# Patient Record
Sex: MALE | Race: WHITE | NOT HISPANIC OR LATINO | URBAN - METROPOLITAN AREA
[De-identification: names, ages, dates, MRNs, and addresses within clinical notes are randomized per-mention and may not be internally consistent; named-entity substitution may affect disease eponyms.]

---
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
## 2022-03-07 ENCOUNTER — OFFICE VISIT (OUTPATIENT)
Dept: INTERNAL MEDICINE | Facility: CLINIC | Age: 22
End: 2022-03-07

## 2022-03-07 VITALS
OXYGEN SATURATION: 99 % | RESPIRATION RATE: 18 BRPM | HEIGHT: 64 IN | TEMPERATURE: 97.6 F | WEIGHT: 99.8 LBS | BODY MASS INDEX: 17.04 KG/M2 | DIASTOLIC BLOOD PRESSURE: 62 MMHG | SYSTOLIC BLOOD PRESSURE: 114 MMHG | HEART RATE: 80 BPM

## 2022-03-07 DIAGNOSIS — Z11.59 NEED FOR HEPATITIS C SCREENING TEST: ICD-10-CM

## 2022-03-07 DIAGNOSIS — F90.9 ATTENTION DEFICIT HYPERACTIVITY DISORDER (ADHD), UNSPECIFIED ADHD TYPE: ICD-10-CM

## 2022-03-07 DIAGNOSIS — R63.6 UNDERWEIGHT: ICD-10-CM

## 2022-03-07 DIAGNOSIS — K59.00 CONSTIPATION, UNSPECIFIED CONSTIPATION TYPE: ICD-10-CM

## 2022-03-07 DIAGNOSIS — Z00.00 ANNUAL PHYSICAL EXAM: Primary | ICD-10-CM

## 2022-03-07 DIAGNOSIS — F84.0 AUTISM: ICD-10-CM

## 2022-03-07 DIAGNOSIS — Z13.220 SCREENING, LIPID: ICD-10-CM

## 2022-03-07 LAB
ALBUMIN SERPL-MCNC: 5.1 G/DL (ref 3.5–5.2)
ALBUMIN/GLOB SERPL: 2.3 G/DL
ALP SERPL-CCNC: 84 U/L (ref 39–117)
ALT SERPL W P-5'-P-CCNC: 17 U/L (ref 1–41)
ANION GAP SERPL CALCULATED.3IONS-SCNC: 10.3 MMOL/L (ref 5–15)
AST SERPL-CCNC: 17 U/L (ref 1–40)
BASOPHILS # BLD AUTO: 0.04 10*3/MM3 (ref 0–0.2)
BASOPHILS NFR BLD AUTO: 0.7 % (ref 0–1.5)
BILIRUB SERPL-MCNC: 0.4 MG/DL (ref 0–1.2)
BUN SERPL-MCNC: 14 MG/DL (ref 6–20)
BUN/CREAT SERPL: 17.3 (ref 7–25)
CALCIUM SPEC-SCNC: 9.6 MG/DL (ref 8.6–10.5)
CHLORIDE SERPL-SCNC: 102 MMOL/L (ref 98–107)
CHOLEST SERPL-MCNC: 124 MG/DL (ref 0–200)
CO2 SERPL-SCNC: 29.7 MMOL/L (ref 22–29)
CREAT SERPL-MCNC: 0.81 MG/DL (ref 0.76–1.27)
DEPRECATED RDW RBC AUTO: 43.8 FL (ref 37–54)
EGFRCR SERPLBLD CKD-EPI 2021: 129.4 ML/MIN/1.73
EOSINOPHIL # BLD AUTO: 0.1 10*3/MM3 (ref 0–0.4)
EOSINOPHIL NFR BLD AUTO: 1.6 % (ref 0.3–6.2)
ERYTHROCYTE [DISTWIDTH] IN BLOOD BY AUTOMATED COUNT: 12.9 % (ref 12.3–15.4)
GLOBULIN UR ELPH-MCNC: 2.2 GM/DL
GLUCOSE SERPL-MCNC: 80 MG/DL (ref 65–99)
HCT VFR BLD AUTO: 48.7 % (ref 37.5–51)
HDLC SERPL-MCNC: 47 MG/DL (ref 40–60)
HGB BLD-MCNC: 16.1 G/DL (ref 13–17.7)
IMM GRANULOCYTES # BLD AUTO: 0.01 10*3/MM3 (ref 0–0.05)
IMM GRANULOCYTES NFR BLD AUTO: 0.2 % (ref 0–0.5)
LDLC SERPL CALC-MCNC: 65 MG/DL (ref 0–100)
LDLC/HDLC SERPL: 1.42 {RATIO}
LYMPHOCYTES # BLD AUTO: 2.75 10*3/MM3 (ref 0.7–3.1)
LYMPHOCYTES NFR BLD AUTO: 45.2 % (ref 19.6–45.3)
MCH RBC QN AUTO: 30.4 PG (ref 26.6–33)
MCHC RBC AUTO-ENTMCNC: 33.1 G/DL (ref 31.5–35.7)
MCV RBC AUTO: 92.1 FL (ref 79–97)
MONOCYTES # BLD AUTO: 0.72 10*3/MM3 (ref 0.1–0.9)
MONOCYTES NFR BLD AUTO: 11.8 % (ref 5–12)
NEUTROPHILS NFR BLD AUTO: 2.47 10*3/MM3 (ref 1.7–7)
NEUTROPHILS NFR BLD AUTO: 40.5 % (ref 42.7–76)
NRBC BLD AUTO-RTO: 0 /100 WBC (ref 0–0.2)
PLATELET # BLD AUTO: 282 10*3/MM3 (ref 140–450)
PMV BLD AUTO: 10.4 FL (ref 6–12)
POTASSIUM SERPL-SCNC: 4.2 MMOL/L (ref 3.5–5.2)
PROT SERPL-MCNC: 7.3 G/DL (ref 6–8.5)
RBC # BLD AUTO: 5.29 10*6/MM3 (ref 4.14–5.8)
SODIUM SERPL-SCNC: 142 MMOL/L (ref 136–145)
TRIGL SERPL-MCNC: 51 MG/DL (ref 0–150)
TSH SERPL DL<=0.05 MIU/L-ACNC: 2.21 UIU/ML (ref 0.27–4.2)
VLDLC SERPL-MCNC: 12 MG/DL (ref 5–40)
WBC NRBC COR # BLD: 6.09 10*3/MM3 (ref 3.4–10.8)

## 2022-03-07 PROCEDURE — 80053 COMPREHEN METABOLIC PANEL: CPT | Performed by: INTERNAL MEDICINE

## 2022-03-07 PROCEDURE — 90471 IMMUNIZATION ADMIN: CPT | Performed by: INTERNAL MEDICINE

## 2022-03-07 PROCEDURE — 80061 LIPID PANEL: CPT | Performed by: INTERNAL MEDICINE

## 2022-03-07 PROCEDURE — 99395 PREV VISIT EST AGE 18-39: CPT | Performed by: INTERNAL MEDICINE

## 2022-03-07 PROCEDURE — 90649 4VHPV VACCINE 3 DOSE IM: CPT | Performed by: INTERNAL MEDICINE

## 2022-03-07 PROCEDURE — 85025 COMPLETE CBC W/AUTO DIFF WBC: CPT | Performed by: INTERNAL MEDICINE

## 2022-03-07 PROCEDURE — 84443 ASSAY THYROID STIM HORMONE: CPT | Performed by: INTERNAL MEDICINE

## 2022-03-07 RX ORDER — AMOXICILLIN 250 MG
1 CAPSULE ORAL DAILY
Qty: 90 TABLET | Refills: 1 | Status: SHIPPED | OUTPATIENT
Start: 2022-03-07 | End: 2022-10-31

## 2022-03-07 NOTE — PROGRESS NOTES
"Chief Complaint  Annual Exam (/)    Subjective          Lazaro Atkinson presents to [facility]  History of Present Illness     Has been dealing with constipation regularly  Tried miralax  Eats very well, not super picky  Doesn't complain of constipation, but mom notices large firm stools    Autism well controlled  ADHD  Doing well off meds  Currently in [facility]    No chest pain  No trouble breathing    Eats well, but still doesn't gain a lot of weight    Out of school  Helping out at home  Not working currently    Objective   Vital Signs:   /62 (BP Location: Left arm, Patient Position: Sitting, Cuff Size: Adult)   Pulse 80   Temp 97.6 °F (36.4 °C)   Resp 18   Ht 162.6 cm (64\")   Wt 45.3 kg (99 lb 12.8 oz)   SpO2 99%   BMI 17.13 kg/m²     Physical Exam  Vitals reviewed.   Constitutional:       Appearance: Normal appearance. He is well-developed.   HENT:      Head: Normocephalic and atraumatic.      Right Ear: External ear normal.      Left Ear: External ear normal.   Eyes:      Conjunctiva/sclera: Conjunctivae normal.      Pupils: Pupils are equal, round, and reactive to light.   Cardiovascular:      Rate and Rhythm: Normal rate and regular rhythm.      Heart sounds: No murmur heard.    No friction rub. No gallop.   Pulmonary:      Effort: Pulmonary effort is normal.      Breath sounds: Normal breath sounds. No wheezing or rhonchi.   Skin:     General: Skin is warm and dry.   Neurological:      Mental Status: He is alert and oriented to person, place, and time.      Cranial Nerves: No cranial nerve deficit.   Psychiatric:         Mood and Affect: Affect normal.         Behavior: Behavior normal.         Thought Content: Thought content normal.        Result Review :           No results found for this or any previous visit.         Procedures        Assessment and Plan    Diagnoses and all orders for this visit:    1. Annual physical exam " (Primary)    2. Constipation, unspecified constipation type  Comments:  will try senna and add miralax as needed; can do imaging if no improvement    3. Underweight  Comments:  will try protein shake  Orders:  -     Comprehensive Metabolic Panel  -     CBC & Differential  -     TSH    4. Autism  Comments:  doing well on Laurie P Waiver    5. Attention deficit hyperactivity disorder (ADHD), unspecified ADHD type  Comments:  doing well off meds    6. Need for hepatitis C screening test  -     Hepatitis C Antibody; Future    7. Screening, lipid  -     Lipid Panel    Other orders  -     sennosides-docusate (senna-docusate sodium) 8.6-50 MG per tablet; Take 1 tablet by mouth Daily.  Dispense: 90 tablet; Refill: 1  -     HPV Vaccine QuadriValent 3 Dose IM        19 to 39: Counseling/Anticipatory Guidance Discussed: nutrition, dental health and immunizations        Follow Up   Return in about 1 year (around 3/7/2023).  Patient was given instructions and counseling regarding his condition or for health maintenance advice. Please see specific information pulled into the AVS if appropriate.

## 2022-05-09 ENCOUNTER — CLINICAL SUPPORT (OUTPATIENT)
Dept: INTERNAL MEDICINE | Facility: CLINIC | Age: 22
End: 2022-05-09

## 2022-05-09 DIAGNOSIS — Z23 NEED FOR HPV VACCINATION: Primary | ICD-10-CM

## 2022-05-09 PROCEDURE — 90651 9VHPV VACCINE 2/3 DOSE IM: CPT | Performed by: PHYSICIAN ASSISTANT

## 2022-05-09 PROCEDURE — 90471 IMMUNIZATION ADMIN: CPT | Performed by: PHYSICIAN ASSISTANT

## 2022-09-08 ENCOUNTER — CLINICAL SUPPORT (OUTPATIENT)
Dept: INTERNAL MEDICINE | Facility: CLINIC | Age: 22
End: 2022-09-08

## 2022-09-08 DIAGNOSIS — Z23 NEED FOR HPV VACCINATION: Primary | ICD-10-CM

## 2022-09-08 PROCEDURE — 90649 4VHPV VACCINE 3 DOSE IM: CPT | Performed by: NURSE PRACTITIONER

## 2022-09-08 PROCEDURE — 90471 IMMUNIZATION ADMIN: CPT | Performed by: NURSE PRACTITIONER

## 2022-09-22 ENCOUNTER — TELEPHONE (OUTPATIENT)
Dept: INTERNAL MEDICINE | Facility: CLINIC | Age: 22
End: 2022-09-22

## 2022-09-27 ENCOUNTER — TELEPHONE (OUTPATIENT)
Dept: INTERNAL MEDICINE | Facility: CLINIC | Age: 22
End: 2022-09-27

## 2022-12-06 ENCOUNTER — CLINICAL SUPPORT (OUTPATIENT)
Dept: INTERNAL MEDICINE | Facility: CLINIC | Age: 22
End: 2022-12-06

## 2022-12-06 DIAGNOSIS — Z23 NEED FOR INFLUENZA VACCINATION: Primary | ICD-10-CM

## 2022-12-06 PROCEDURE — 90686 IIV4 VACC NO PRSV 0.5 ML IM: CPT | Performed by: INTERNAL MEDICINE

## 2022-12-06 PROCEDURE — 90471 IMMUNIZATION ADMIN: CPT | Performed by: INTERNAL MEDICINE

## 2023-01-17 ENCOUNTER — IMMUNIZATION (OUTPATIENT)
Dept: INTERNAL MEDICINE | Facility: CLINIC | Age: 23
End: 2023-01-17
Payer: MEDICAID

## 2023-01-17 PROCEDURE — 91312 COVID-19 (PFIZER) BIVALENT BOOSTER 12+YRS: CPT | Performed by: INTERNAL MEDICINE

## 2023-01-17 PROCEDURE — 0124A COVID-19 (PFIZER) BIVALENT BOOSTER 12+YRS: CPT | Performed by: INTERNAL MEDICINE

## 2023-10-11 ENCOUNTER — CLINICAL SUPPORT (OUTPATIENT)
Dept: INTERNAL MEDICINE | Facility: CLINIC | Age: 23
End: 2023-10-11
Payer: MEDICAID

## 2023-10-11 DIAGNOSIS — Z23 ENCOUNTER FOR IMMUNIZATION: Primary | ICD-10-CM

## 2023-10-11 PROCEDURE — 90686 IIV4 VACC NO PRSV 0.5 ML IM: CPT | Performed by: INTERNAL MEDICINE

## 2023-10-11 PROCEDURE — 90471 IMMUNIZATION ADMIN: CPT | Performed by: INTERNAL MEDICINE

## 2023-10-17 ENCOUNTER — TELEPHONE (OUTPATIENT)
Dept: INTERNAL MEDICINE | Facility: CLINIC | Age: 23
End: 2023-10-17
Payer: MEDICAID

## 2023-10-17 NOTE — TELEPHONE ENCOUNTER
Caller: JULIEN JUSTIN    Relationship: Mother     Best call back number: [phone]     What is the best time to reach you: ASAP    Who are you requesting to speak with (clinical staff, provider,  specific staff member): CLINICAL STAFF    What was the call regarding: PATIENT MAP 10 PAPERWORK WAS FAXED OVER INCORRECTLY AGAIN. THERE WAS NO DIAGNOSIS CODE ON THE PAPERWORK AND THE PATIENTS MOTHER WOULD LIKE A CALL TO REVIEW THE PAPERWORK

## 2023-11-17 ENCOUNTER — OFFICE VISIT (OUTPATIENT)
Dept: INTERNAL MEDICINE | Facility: CLINIC | Age: 23
End: 2023-11-17
Payer: MEDICAID

## 2023-11-17 VITALS
TEMPERATURE: 98.9 F | RESPIRATION RATE: 18 BRPM | HEART RATE: 78 BPM | SYSTOLIC BLOOD PRESSURE: 112 MMHG | HEIGHT: 64 IN | BODY MASS INDEX: 16.7 KG/M2 | OXYGEN SATURATION: 98 % | WEIGHT: 97.8 LBS | DIASTOLIC BLOOD PRESSURE: 68 MMHG

## 2023-11-17 DIAGNOSIS — Z13.220 SCREENING, LIPID: ICD-10-CM

## 2023-11-17 DIAGNOSIS — Z00.00 ANNUAL PHYSICAL EXAM: Primary | ICD-10-CM

## 2023-11-17 DIAGNOSIS — Z11.59 NEED FOR HEPATITIS C SCREENING TEST: ICD-10-CM

## 2023-11-17 DIAGNOSIS — L40.9 PSORIASIS: ICD-10-CM

## 2023-11-17 LAB
25(OH)D3 SERPL-MCNC: 14.4 NG/ML (ref 30–100)
BASOPHILS # BLD AUTO: 0.06 10*3/MM3 (ref 0–0.2)
BASOPHILS NFR BLD AUTO: 1 % (ref 0–1.5)
CHOLEST SERPL-MCNC: 111 MG/DL (ref 0–200)
DEPRECATED RDW RBC AUTO: 40.7 FL (ref 37–54)
EOSINOPHIL # BLD AUTO: 0.1 10*3/MM3 (ref 0–0.4)
EOSINOPHIL NFR BLD AUTO: 1.7 % (ref 0.3–6.2)
ERYTHROCYTE [DISTWIDTH] IN BLOOD BY AUTOMATED COUNT: 12.3 % (ref 12.3–15.4)
FOLATE SERPL-MCNC: 8.24 NG/ML (ref 4.78–24.2)
HCT VFR BLD AUTO: 44.9 % (ref 37.5–51)
HCV AB SER DONR QL: NORMAL
HDLC SERPL-MCNC: 48 MG/DL (ref 40–60)
HGB BLD-MCNC: 14.8 G/DL (ref 13–17.7)
IMM GRANULOCYTES # BLD AUTO: 0.01 10*3/MM3 (ref 0–0.05)
IMM GRANULOCYTES NFR BLD AUTO: 0.2 % (ref 0–0.5)
IRON 24H UR-MRATE: 116 MCG/DL (ref 59–158)
IRON SATN MFR SERPL: 34 % (ref 20–50)
LDLC SERPL CALC-MCNC: 46 MG/DL (ref 0–100)
LDLC/HDLC SERPL: 0.95 {RATIO}
LYMPHOCYTES # BLD AUTO: 2.61 10*3/MM3 (ref 0.7–3.1)
LYMPHOCYTES NFR BLD AUTO: 45.2 % (ref 19.6–45.3)
MAGNESIUM SERPL-MCNC: 2.2 MG/DL (ref 1.6–2.6)
MCH RBC QN AUTO: 29.7 PG (ref 26.6–33)
MCHC RBC AUTO-ENTMCNC: 33 G/DL (ref 31.5–35.7)
MCV RBC AUTO: 90 FL (ref 79–97)
MONOCYTES # BLD AUTO: 0.59 10*3/MM3 (ref 0.1–0.9)
MONOCYTES NFR BLD AUTO: 10.2 % (ref 5–12)
NEUTROPHILS NFR BLD AUTO: 2.4 10*3/MM3 (ref 1.7–7)
NEUTROPHILS NFR BLD AUTO: 41.7 % (ref 42.7–76)
NRBC BLD AUTO-RTO: 0 /100 WBC (ref 0–0.2)
PLATELET # BLD AUTO: 268 10*3/MM3 (ref 140–450)
PMV BLD AUTO: 11.4 FL (ref 6–12)
RBC # BLD AUTO: 4.99 10*6/MM3 (ref 4.14–5.8)
TIBC SERPL-MCNC: 341 MCG/DL (ref 298–536)
TRANSFERRIN SERPL-MCNC: 229 MG/DL (ref 200–360)
TRIGL SERPL-MCNC: 87 MG/DL (ref 0–150)
TSH SERPL DL<=0.05 MIU/L-ACNC: 0.78 UIU/ML (ref 0.27–4.2)
VIT B12 BLD-MCNC: 882 PG/ML (ref 211–946)
VLDLC SERPL-MCNC: 17 MG/DL (ref 5–40)
WBC NRBC COR # BLD AUTO: 5.77 10*3/MM3 (ref 3.4–10.8)

## 2023-11-17 PROCEDURE — 83735 ASSAY OF MAGNESIUM: CPT | Performed by: INTERNAL MEDICINE

## 2023-11-17 PROCEDURE — 80053 COMPREHEN METABOLIC PANEL: CPT | Performed by: INTERNAL MEDICINE

## 2023-11-17 PROCEDURE — 82746 ASSAY OF FOLIC ACID SERUM: CPT | Performed by: INTERNAL MEDICINE

## 2023-11-17 PROCEDURE — 82607 VITAMIN B-12: CPT | Performed by: INTERNAL MEDICINE

## 2023-11-17 PROCEDURE — 84466 ASSAY OF TRANSFERRIN: CPT | Performed by: INTERNAL MEDICINE

## 2023-11-17 PROCEDURE — 86803 HEPATITIS C AB TEST: CPT | Performed by: INTERNAL MEDICINE

## 2023-11-17 PROCEDURE — 83540 ASSAY OF IRON: CPT | Performed by: INTERNAL MEDICINE

## 2023-11-17 PROCEDURE — 85025 COMPLETE CBC W/AUTO DIFF WBC: CPT | Performed by: INTERNAL MEDICINE

## 2023-11-17 PROCEDURE — 82306 VITAMIN D 25 HYDROXY: CPT | Performed by: INTERNAL MEDICINE

## 2023-11-17 PROCEDURE — 80061 LIPID PANEL: CPT | Performed by: INTERNAL MEDICINE

## 2023-11-17 PROCEDURE — 84443 ASSAY THYROID STIM HORMONE: CPT | Performed by: INTERNAL MEDICINE

## 2023-11-17 RX ORDER — BETAMETHASONE DIPROPIONATE 0.5 MG/G
1 CREAM TOPICAL 2 TIMES DAILY
Qty: 45 G | Refills: 1 | Status: SHIPPED | OUTPATIENT
Start: 2023-11-17

## 2023-11-17 RX ORDER — CLOBETASOL PROPIONATE 0.46 MG/ML
SOLUTION TOPICAL 2 TIMES DAILY
Qty: 50 ML | Refills: 1 | Status: SHIPPED | OUTPATIENT
Start: 2023-11-17

## 2023-11-17 NOTE — PROGRESS NOTES
"Chief Complaint  Annual Exam, Hair/Scalp Problem (Itchy scalp ), and Nail Problem (Nail is really thick on the left feet big toe)    Subjective      History of Present Illness  Lazaro Atkinson is a 23 y.o. male who presents to [facility] with a past medical history of  Past Medical History:   Diagnosis Date    ADHD     Autism     Developmental delay     Premature baby       has noticed a few spots on his scalp  They have tried some otc shampoos and leave in conditioners  He does have a spot that is red and puffy    Left great toenail with a very thickened toenaisl    No chest pain  No truble breahting    No family histoyr of colon or prostate cancer    Tired at times, but otherwise doing well      Objective   Vital Signs:   Vitals:    11/17/23 1331   BP: 112/68   BP Location: Right arm   Patient Position: Sitting   Cuff Size: Small Adult   Pulse: 78   Resp: 18   Temp: 98.9 °F (37.2 °C)   TempSrc: Temporal   SpO2: 98%   Weight: 44.4 kg (97 lb 12.8 oz)   Height: 162.6 cm (64.02\")     Body mass index is 16.78 kg/m².    Wt Readings from Last 3 Encounters:   11/17/23 44.4 kg (97 lb 12.8 oz)   10/31/22 45.1 kg (99 lb 8 oz)   03/07/22 45.3 kg (99 lb 12.8 oz)     BP Readings from Last 3 Encounters:   11/17/23 112/68   10/31/22 120/71   03/07/22 114/62       Health Maintenance   Topic Date Due    ANNUAL PHYSICAL  03/07/2023    BMI FOLLOWUP  11/17/2024    TDAP/TD VACCINES (3 - Td or Tdap) 11/17/2033    HEPATITIS C SCREENING  Completed    COVID-19 Vaccine  Completed    Pneumococcal Vaccine 0-64  Completed    INFLUENZA VACCINE  Completed    MENINGOCOCCAL VACCINE  Completed    HPV VACCINES  Completed       Physical Exam  Vitals reviewed.   Constitutional:       Appearance: Normal appearance. He is well-developed.   HENT:      Head: Normocephalic and atraumatic.      Right Ear: External ear normal.      Left Ear: External ear normal.   Eyes:      Conjunctiva/sclera: " Conjunctivae normal.      Pupils: Pupils are equal, round, and reactive to light.   Cardiovascular:      Rate and Rhythm: Normal rate and regular rhythm.      Heart sounds: No murmur heard.     No friction rub. No gallop.   Pulmonary:      Effort: Pulmonary effort is normal.      Breath sounds: Normal breath sounds. No wheezing or rhonchi.   Skin:     General: Skin is warm and dry.      Comments: Significant flaking of his scalp with auspitz sign   Neurological:      Mental Status: He is alert and oriented to person, place, and time.   Psychiatric:         Mood and Affect: Affect normal.         Behavior: Behavior normal.         Thought Content: Thought content normal.            Result Review :  The following data was reviewed by: Feli Marques MD on 11/17/2023:      Procedures        Assessment and Plan   Diagnoses and all orders for this visit:    1. Annual physical exam (Primary)  Comments:  counseled on routine screening and healthy lifestyle  Orders:  -     Comprehensive Metabolic Panel  -     CBC & Differential  -     TSH  -     Lipid Panel  -     Vitamin D,25-Hydroxy  -     Vitamin B12 & Folate  -     Iron Profile  -     Magnesium    2. Psoriasis  Comments:  will try creams to start, but fairly significant so may need po treatment  Orders:  -     Comprehensive Metabolic Panel  -     CBC & Differential  -     TSH  -     Lipid Panel  -     Vitamin D,25-Hydroxy  -     Vitamin B12 & Folate  -     Iron Profile  -     Magnesium    3. Screening, lipid  -     Lipid Panel    4. Need for hepatitis C screening test  -     Hepatitis C Antibody; Future  -     Hepatitis C Antibody    Other orders  -     betamethasone dipropionate 0.05 % cream; Apply 1 application  topically to the appropriate area as directed 2 (Two) Times a Day.  Dispense: 45 g; Refill: 1  -     clobetasol (TEMOVATE) 0.05 % external solution; Apply  topically to the appropriate area as directed 2 (Two) Times a Day.  Dispense: 50 mL; Refill: 1  -      Tdap Vaccine => 8yo IM (BOOSTRIX)      Will check labs and adjust meds as needed based on results    BMI is below normal parameters (malnutrition). Recommendations: thin, but that is a family history and he is eatin well         FOLLOW UP  Return in about 3 months (around 2/17/2024).  Patient was given instructions and counseling regarding his condition or for health maintenance advice. Please see specific information pulled into the AVS if appropriate.       Feli Marques MD  11/26/23  18:04 EST    CURRENT & DISCONTINUED MEDICATIONS  Current Outpatient Medications   Medication Instructions    acetaminophen (TYLENOL) 500 mg, Oral, Every 6 Hours PRN    betamethasone dipropionate 0.05 % cream 1 application , Topical, 2 Times Daily    clobetasol (TEMOVATE) 0.05 % external solution Topical, 2 Times Daily       Medications Discontinued During This Encounter   Medication Reason    brompheniramine-pseudoephedrine-DM 30-2-10 MG/5ML syrup     oseltamivir (Tamiflu) 75 MG capsule     predniSONE (DELTASONE) 20 MG tablet

## 2023-11-18 LAB
ALBUMIN SERPL-MCNC: 5 G/DL (ref 3.5–5.2)
ALBUMIN/GLOB SERPL: 2.6 G/DL
ALP SERPL-CCNC: 78 U/L (ref 39–117)
ALT SERPL W P-5'-P-CCNC: 16 U/L (ref 1–41)
ANION GAP SERPL CALCULATED.3IONS-SCNC: 10.7 MMOL/L (ref 5–15)
AST SERPL-CCNC: 14 U/L (ref 1–40)
BILIRUB SERPL-MCNC: 0.2 MG/DL (ref 0–1.2)
BUN SERPL-MCNC: 19 MG/DL (ref 6–20)
BUN/CREAT SERPL: 23.5 (ref 7–25)
CALCIUM SPEC-SCNC: 9.6 MG/DL (ref 8.6–10.5)
CHLORIDE SERPL-SCNC: 101 MMOL/L (ref 98–107)
CO2 SERPL-SCNC: 28.3 MMOL/L (ref 22–29)
CREAT SERPL-MCNC: 0.81 MG/DL (ref 0.76–1.27)
EGFRCR SERPLBLD CKD-EPI 2021: 128.6 ML/MIN/1.73
GLOBULIN UR ELPH-MCNC: 1.9 GM/DL
GLUCOSE SERPL-MCNC: 73 MG/DL (ref 65–99)
POTASSIUM SERPL-SCNC: 4.2 MMOL/L (ref 3.5–5.2)
PROT SERPL-MCNC: 6.9 G/DL (ref 6–8.5)
SODIUM SERPL-SCNC: 140 MMOL/L (ref 136–145)

## 2023-11-25 ENCOUNTER — PATIENT MESSAGE (OUTPATIENT)
Dept: INTERNAL MEDICINE | Facility: CLINIC | Age: 23
End: 2023-11-25
Payer: MEDICAID

## 2023-11-27 NOTE — TELEPHONE ENCOUNTER
From: Lazaro Atkinson  To: Feli Marques  Sent: 11/25/2023 9:37 AM EST  Subject: Psoriasis    The cream and the liquid for Lazaro’s Psoriasis is not working. Can you call in the pill form for him?

## 2023-11-29 ENCOUNTER — PRIOR AUTHORIZATION (OUTPATIENT)
Dept: INTERNAL MEDICINE | Facility: CLINIC | Age: 23
End: 2023-11-29
Payer: MEDICAID

## 2023-11-29 NOTE — TELEPHONE ENCOUNTER
Pa started for otezla via cover my meds          PA has been Approved. PA End Date: 05/27/2024

## 2023-12-01 NOTE — TELEPHONE ENCOUNTER
Pt's mother calling in wanting to know if paper had been faxed back to pharmacy with the information they were requesting.

## 2023-12-01 NOTE — TELEPHONE ENCOUNTER
Pt states the pharmacy sent it through the med portal, can we look in maybe the PA approvals? Is that what it is?

## 2023-12-26 ENCOUNTER — PATIENT MESSAGE (OUTPATIENT)
Dept: INTERNAL MEDICINE | Facility: CLINIC | Age: 23
End: 2023-12-26
Payer: MEDICAID

## 2023-12-29 NOTE — TELEPHONE ENCOUNTER
From: Lazaro Atkinson  To: Feli Marques  Sent: 12/26/2023 6:33 PM EST  Subject: Otezla     I need to get a refill called into [id number] pharmacy for Lazaro’s Otezla.

## 2024-01-02 ENCOUNTER — TELEPHONE (OUTPATIENT)
Dept: INTERNAL MEDICINE | Facility: CLINIC | Age: 24
End: 2024-01-02
Payer: MEDICAID

## 2024-01-02 NOTE — TELEPHONE ENCOUNTER
Caller: Samantha Jimenez    Relationship: Emergency Contact    Best call back number: [phone]    Which medication are you concerned about: Apremilast 30 MG tablet     Who prescribed you this medication: RICHARD     When did you start taking this medication: FULL DOSE= NOT YET     What are your concerns: PATIENT CURRENTLY ONLY HAS ENOUGH MEDICINE UNTIL 01/03/2024. PHARMACY IS NEEDING MORE INFORMATION BEFORE DISPENSING TO PATIENT.

## 2024-01-02 NOTE — TELEPHONE ENCOUNTER
Called and spoke with pt's mother, explained to her I has spoken with the pharmacy and the pharmacy informed me the billing information has changed, I took all the information down and asked the pharmacy to send us a new PA, I explained to pt's mother provider has sent in a script for the medication we have to just have the insurance approve it. Pharmacy stated they have sent a new PA waiting for that to come through, explained to pt's mother we are working on it and hopefully everything will be lined out today or tomorrow, pt's mother stated pt has enough medication to last until Thursday at the latest. I encouraged pt's  mother to call us if she needed anything else.

## 2024-06-24 RX ORDER — APREMILAST 30 MG/1
1 TABLET, FILM COATED ORAL DAILY
Qty: 90 TABLET | Refills: 0 | Status: SHIPPED | OUTPATIENT
Start: 2024-06-24

## 2024-09-17 RX ORDER — APREMILAST 30 MG/1
1 TABLET, FILM COATED ORAL DAILY
Qty: 90 TABLET | Refills: 0 | Status: SHIPPED | OUTPATIENT
Start: 2024-09-17

## 2024-11-12 ENCOUNTER — TELEPHONE (OUTPATIENT)
Dept: INTERNAL MEDICINE | Facility: CLINIC | Age: 24
End: 2024-11-12
Payer: MEDICAID

## 2024-11-12 NOTE — TELEPHONE ENCOUNTER
Caller: Samantha Jimenez    Relationship: Emergency Contact    Best call back number: [phone]     What form or medical record are you requesting: MAP 10 FORM DID NOT HAVE DIAGNOSIS CODE. PLEASE FILL OUT AND RESUBMIT.       How would you like to receive the form or medical records (pick-up, mail, fax): NUMBER SHOULD BE ON PAPERWORK THAT WAS DROPPED OFF.     Timeframe paperwork needed: AS SOON AS POSSIBLE    Additional notes: PLEASE CONTACT GUARDIAN ONCE DONE.

## 2024-11-12 NOTE — TELEPHONE ENCOUNTER
New form filled out with diagnosis and codes listed next to them, waiting for provider signature and I will fax to [facility].

## 2024-11-13 NOTE — TELEPHONE ENCOUNTER
Attempted to call pt's emergency contact to inform her paperwork has been redone, unable to reach pt's emergency contact, left voicemail for a return call to the office.

## 2024-12-28 RX ORDER — APREMILAST 30 MG/1
1 TABLET, FILM COATED ORAL DAILY
Qty: 90 TABLET | Refills: 0 | Status: SHIPPED | OUTPATIENT
Start: 2024-12-28

## 2024-12-31 ENCOUNTER — PRIOR AUTHORIZATION (OUTPATIENT)
Dept: INTERNAL MEDICINE | Facility: CLINIC | Age: 24
End: 2024-12-31
Payer: MEDICAID

## 2024-12-31 NOTE — TELEPHONE ENCOUNTER
Otezla 30MG tablets      The authorization is effective from 12/30/2024 to 12/29/2025, as long as you are enrolled as   a member of your current health plan.  The request was approved as submitted.

## 2025-01-13 RX ORDER — APREMILAST 30 MG/1
1 TABLET, FILM COATED ORAL DAILY
Qty: 90 TABLET | Refills: 0 | Status: SHIPPED | OUTPATIENT
Start: 2025-01-13

## 2025-02-27 ENCOUNTER — PATIENT MESSAGE (OUTPATIENT)
Dept: INTERNAL MEDICINE | Facility: CLINIC | Age: 25
End: 2025-02-27
Payer: MEDICAID

## 2025-02-27 DIAGNOSIS — L40.9 PSORIASIS: Primary | ICD-10-CM

## 2025-06-04 ENCOUNTER — OFFICE VISIT (OUTPATIENT)
Dept: INTERNAL MEDICINE | Facility: CLINIC | Age: 25
End: 2025-06-04
Payer: MEDICAID

## 2025-06-04 VITALS
TEMPERATURE: 97.6 F | HEART RATE: 85 BPM | DIASTOLIC BLOOD PRESSURE: 78 MMHG | RESPIRATION RATE: 18 BRPM | BODY MASS INDEX: 17.21 KG/M2 | SYSTOLIC BLOOD PRESSURE: 122 MMHG | WEIGHT: 100.8 LBS | HEIGHT: 64 IN | OXYGEN SATURATION: 99 %

## 2025-06-04 DIAGNOSIS — L40.9 PSORIASIS: Primary | ICD-10-CM

## 2025-06-04 PROCEDURE — 99213 OFFICE O/P EST LOW 20 MIN: CPT | Performed by: INTERNAL MEDICINE

## 2025-06-04 RX ORDER — CLOBETASOL PROPIONATE 0.5 MG/G
1 OINTMENT TOPICAL 2 TIMES DAILY
Qty: 60 G | Refills: 1 | Status: SHIPPED | OUTPATIENT
Start: 2025-06-04

## 2025-06-04 RX ORDER — APREMILAST 30 MG/1
1 TABLET, FILM COATED ORAL 2 TIMES DAILY
Qty: 180 TABLET | Refills: 0 | Status: SHIPPED | OUTPATIENT
Start: 2025-06-04

## 2025-06-04 NOTE — PROGRESS NOTES
"Chief Complaint  Psoriasis (Scalp. Medication doesn't seem to be working /)      Subjective      History of Present Illness  The patient presents for evaluation of scalp psoriasis.    Otezla has been ineffective, evidenced by scabs on the scalp. He is uncertain about the itchiness of the scabs. He continues Otezla without gastrointestinal side effects or depression. No dermatologist appointment due to insurance issues. Typically showers at night.         Objective   Vital Signs:   Vitals:    06/04/25 1501   BP: 122/78   BP Location: Left arm   Patient Position: Sitting   Cuff Size: Pediatric   Pulse: 85   Resp: 18   Temp: 97.6 °F (36.4 °C)   TempSrc: Temporal   SpO2: 99%   Weight: 45.7 kg (100 lb 12.8 oz)   Height: 162.6 cm (64.02\")     Body mass index is 17.29 kg/m².    Wt Readings from Last 3 Encounters:   06/04/25 45.7 kg (100 lb 12.8 oz)   11/17/23 44.4 kg (97 lb 12.8 oz)   10/31/22 45.1 kg (99 lb 8 oz)     BP Readings from Last 3 Encounters:   06/04/25 122/78   11/17/23 112/68   10/31/22 120/71       Health Maintenance   Topic Date Due    MENINGOCOCCAL B VACCINE (1 of 2 - Standard) Never done    ANNUAL PHYSICAL  11/17/2024    INFLUENZA VACCINE  07/01/2025    TDAP/TD VACCINES (3 - Td or Tdap) 11/17/2033    HEPATITIS C SCREENING  Completed    COVID-19 Vaccine  Completed    Pneumococcal Vaccine 0-49  Completed       Physical Exam  Vitals reviewed.   Constitutional:       Appearance: Normal appearance. He is well-developed.   HENT:      Head: Normocephalic and atraumatic.      Right Ear: External ear normal.      Left Ear: External ear normal.   Eyes:      Conjunctiva/sclera: Conjunctivae normal.      Pupils: Pupils are equal, round, and reactive to light.   Cardiovascular:      Rate and Rhythm: Normal rate and regular rhythm.      Heart sounds: No murmur heard.     No friction rub. No gallop.   Pulmonary:      Effort: Pulmonary effort is normal.      Breath sounds: Normal breath sounds. No wheezing or rhonchi. "   Skin:     General: Skin is warm and dry.   Neurological:      Mental Status: He is alert and oriented to person, place, and time.   Psychiatric:         Mood and Affect: Affect normal.         Behavior: Behavior normal.         Thought Content: Thought content normal.        Physical Exam  Skin: Scalp shows improvement with fewer plaques compared to the last visit. Scabs present around ears.      Result Review :  The following data was reviewed by: Feli Marques MD on 06/04/2025:         Results             Procedures            Assessment & Plan  Psoriasis              Assessment & Plan  Scalp psoriasis  - Improvement noted with reduced plaque size since last visit  - Suboptimal response to Otezla without gastrointestinal issues or depression  - Increase Otezla to 60 mg (30 mg AM, 30 mg PM)  - Prescribe new ointment for application behind ears twice daily  - Apply thin layer of ointment post-shower and monitor for side effects (diarrhea, nausea, depression)  - Consider steroid injections if itching persists or no improvement with increased Otezla dosage    Patient or patient representative verbalized consent for the use of Ambient Listening during the visit with  Feli Marques MD for chart documentation. 6/4/2025  16:05 EDT      FOLLOW UP  No follow-ups on file.  Patient was given instructions and counseling regarding his condition or for health maintenance advice. Please see specific information pulled into the AVS if appropriate.     Feli Marques MD  06/04/25  15:57 EDT    CURRENT & DISCONTINUED MEDICATIONS  Current Outpatient Medications   Medication Instructions    clobetasol (TEMOVATE) 0.05 % ointment 1 Application, Topical, 2 Times Daily    Otezla 30 mg, Oral, 2 Times Daily       Medications Discontinued During This Encounter   Medication Reason    acetaminophen (TYLENOL) 500 MG tablet     betamethasone dipropionate 0.05 % cream     clobetasol (TEMOVATE) 0.05 % external solution     Otezla  30 MG tablet Reorder

## 2025-07-03 RX ORDER — APREMILAST 30 MG/1
1 TABLET, FILM COATED ORAL 2 TIMES DAILY
Qty: 180 TABLET | Refills: 0 | Status: SHIPPED | OUTPATIENT
Start: 2025-07-03